# Patient Record
Sex: FEMALE | Race: WHITE | NOT HISPANIC OR LATINO | Employment: OTHER | URBAN - METROPOLITAN AREA
[De-identification: names, ages, dates, MRNs, and addresses within clinical notes are randomized per-mention and may not be internally consistent; named-entity substitution may affect disease eponyms.]

---

## 2020-02-07 ENCOUNTER — HOSPITAL ENCOUNTER (EMERGENCY)
Facility: CLINIC | Age: 75
Discharge: HOME | End: 2020-02-07
Attending: EMERGENCY MEDICINE | Admitting: EMERGENCY MEDICINE
Payer: COMMERCIAL

## 2020-02-07 ENCOUNTER — APPOINTMENT (OUTPATIENT)
Dept: GENERAL RADIOLOGY | Facility: CLINIC | Age: 75
End: 2020-02-07
Attending: EMERGENCY MEDICINE
Payer: COMMERCIAL

## 2020-02-07 VITALS
RESPIRATION RATE: 14 BRPM | TEMPERATURE: 97.4 F | SYSTOLIC BLOOD PRESSURE: 127 MMHG | WEIGHT: 136 LBS | BODY MASS INDEX: 19.47 KG/M2 | HEIGHT: 70 IN | OXYGEN SATURATION: 100 % | DIASTOLIC BLOOD PRESSURE: 66 MMHG

## 2020-02-07 DIAGNOSIS — R53.83 OTHER FATIGUE: ICD-10-CM

## 2020-02-07 DIAGNOSIS — R06.02 SHORTNESS OF BREATH: ICD-10-CM

## 2020-02-07 LAB
ANION GAP SERPL CALCULATED.3IONS-SCNC: 9 MMOL/L (ref 3–14)
BASOPHILS # BLD AUTO: 0.1 10E9/L (ref 0–0.2)
BASOPHILS NFR BLD AUTO: 0.8 %
BUN SERPL-MCNC: 22 MG/DL (ref 7–30)
CALCIUM SERPL-MCNC: 9 MG/DL (ref 8.5–10.1)
CHLORIDE SERPL-SCNC: 103 MMOL/L (ref 94–109)
CO2 SERPL-SCNC: 22 MMOL/L (ref 20–32)
CREAT SERPL-MCNC: 0.86 MG/DL (ref 0.52–1.04)
DIFFERENTIAL METHOD BLD: ABNORMAL
EOSINOPHIL # BLD AUTO: 0.1 10E9/L (ref 0–0.7)
EOSINOPHIL NFR BLD AUTO: 1.5 %
ERYTHROCYTE [DISTWIDTH] IN BLOOD BY AUTOMATED COUNT: 13.2 % (ref 10–15)
GFR SERPL CREATININE-BSD FRML MDRD: 67 ML/MIN/{1.73_M2}
GLUCOSE SERPL-MCNC: 100 MG/DL (ref 70–99)
HCT VFR BLD AUTO: 36.7 % (ref 35–47)
HGB BLD-MCNC: 11.7 G/DL (ref 11.7–15.7)
IMM GRANULOCYTES # BLD: 0 10E9/L (ref 0–0.4)
IMM GRANULOCYTES NFR BLD: 0.2 %
LYMPHOCYTES # BLD AUTO: 2.8 10E9/L (ref 0.8–5.3)
LYMPHOCYTES NFR BLD AUTO: 47 %
MCH RBC QN AUTO: 31.1 PG (ref 26.5–33)
MCHC RBC AUTO-ENTMCNC: 31.9 G/DL (ref 31.5–36.5)
MCV RBC AUTO: 98 FL (ref 78–100)
MONOCYTES # BLD AUTO: 0.5 10E9/L (ref 0–1.3)
MONOCYTES NFR BLD AUTO: 8.8 %
NEUTROPHILS # BLD AUTO: 2.5 10E9/L (ref 1.6–8.3)
NEUTROPHILS NFR BLD AUTO: 41.7 %
NRBC # BLD AUTO: 0 10*3/UL
NRBC BLD AUTO-RTO: 0 /100
PLATELET # BLD AUTO: 223 10E9/L (ref 150–450)
POTASSIUM SERPL-SCNC: 3.5 MMOL/L (ref 3.4–5.3)
PROCALCITONIN SERPL-MCNC: <0.05 NG/ML
RBC # BLD AUTO: 3.76 10E12/L (ref 3.8–5.2)
SODIUM SERPL-SCNC: 134 MMOL/L (ref 133–144)
TROPONIN I SERPL-MCNC: <0.015 UG/L (ref 0–0.04)
WBC # BLD AUTO: 5.9 10E9/L (ref 4–11)

## 2020-02-07 PROCEDURE — 96360 HYDRATION IV INFUSION INIT: CPT

## 2020-02-07 PROCEDURE — 84145 PROCALCITONIN (PCT): CPT | Performed by: EMERGENCY MEDICINE

## 2020-02-07 PROCEDURE — 71046 X-RAY EXAM CHEST 2 VIEWS: CPT

## 2020-02-07 PROCEDURE — 84484 ASSAY OF TROPONIN QUANT: CPT | Performed by: EMERGENCY MEDICINE

## 2020-02-07 PROCEDURE — 80048 BASIC METABOLIC PNL TOTAL CA: CPT | Performed by: EMERGENCY MEDICINE

## 2020-02-07 PROCEDURE — 85025 COMPLETE CBC W/AUTO DIFF WBC: CPT | Performed by: EMERGENCY MEDICINE

## 2020-02-07 PROCEDURE — 99285 EMERGENCY DEPT VISIT HI MDM: CPT | Mod: 25

## 2020-02-07 PROCEDURE — 25800030 ZZH RX IP 258 OP 636: Performed by: EMERGENCY MEDICINE

## 2020-02-07 PROCEDURE — 93005 ELECTROCARDIOGRAM TRACING: CPT

## 2020-02-07 PROCEDURE — 25000132 ZZH RX MED GY IP 250 OP 250 PS 637: Performed by: EMERGENCY MEDICINE

## 2020-02-07 PROCEDURE — 96361 HYDRATE IV INFUSION ADD-ON: CPT

## 2020-02-07 RX ORDER — LORAZEPAM 0.5 MG/1
0.5 TABLET ORAL ONCE
Status: COMPLETED | OUTPATIENT
Start: 2020-02-07 | End: 2020-02-07

## 2020-02-07 RX ADMIN — LORAZEPAM 0.5 MG: 0.5 TABLET ORAL at 18:20

## 2020-02-07 RX ADMIN — SODIUM CHLORIDE 1000 ML: 9 INJECTION, SOLUTION INTRAVENOUS at 17:27

## 2020-02-07 ASSESSMENT — ENCOUNTER SYMPTOMS
BLOOD IN STOOL: 0
SHORTNESS OF BREATH: 1
PALPITATIONS: 1
CHEST TIGHTNESS: 1
NAUSEA: 1
APPETITE CHANGE: 0
LIGHT-HEADEDNESS: 1
ABDOMINAL PAIN: 0
NERVOUS/ANXIOUS: 1
FATIGUE: 1
DIARRHEA: 0
HEADACHES: 0
COUGH: 0
VOMITING: 0

## 2020-02-07 ASSESSMENT — MIFFLIN-ST. JEOR: SCORE: 1207.14

## 2020-02-07 NOTE — ED PROVIDER NOTES
History     Chief Complaint:  Generalized Weakness    HPI   Lyndsey Orosco is a 74 year old female with a history of asthma, hypothyroidism and recent pneumonia who presents to the emergency department for evaluation of generalized weakness. The patient reports that on 01/14/2020 she was seen for an ear infection and bronchitis and she was treated. She was then seen on 1/18/2020 and diagnosed with pneumonia. She spent 2 days in the hospital and was discharged with 2 days of a antibiotics. She then had a follow up visit with her clinic yesterday and there were no concerns at the clinic. Since her discharge the patient states that every few days she gets spells where she gets lightheaded and short of breath. Today she reports that she was sitting at a computer most of the afternoon and doing some light cleaning when she began to feel some chest pressure. She reports some central chest pressure and irritation in her chest. The patient then states that at about 1615 she began to feel lightheaded like she would pass out. She reports feeling palpitations, fatigue and feeling nauseated. She states a concern that she still has pneumonia. The patient does mention that her symptoms are similar to the symptoms that present with her anxiety attacks, but she only has anxiety attacks about once a year. She denies any coughing, vomiting, abdominal pain, diarrhea, leg swelling, rash, headache, changes in appetite, blood stool or visual disturbances.    Allergies:  Penicillins  Ciprofloxacin    Medications:    Albuterol  Excedrin Migraine  Atenolol  Synthroid  Opcon  Xanax    Past Medical History:    Anxiety   Vertigo  Pneumonia  Osteoporosis  Asthma  Monoclonal Gammopathy  Neuroma  Hypothyroidism  Cataract  Headache  Panic Attack    Past Surgical History:    Foot Neuroma surgery  Ankle fracture treatment  Tonsillectomy  Breast cyst aspiration  Breast biopsy    Family History:    Cardiovascular  "  Depression  Migraines  Suicide  TB  Cataract  Heart failure  Macular degeneration  Stroke    Social History:  The patient was accompanied to the ED by her .  Smoking Status: Never  Smokeless Tobacco: Never  Alcohol Use: Yes  Drug Use: Not on file  Marital Status:        Review of Systems   Constitutional: Positive for fatigue. Negative for appetite change.   Eyes: Negative for visual disturbance.   Respiratory: Positive for chest tightness and shortness of breath. Negative for cough.    Cardiovascular: Positive for palpitations. Negative for leg swelling.   Gastrointestinal: Positive for nausea. Negative for abdominal pain, blood in stool, diarrhea and vomiting.   Skin: Negative for rash.   Neurological: Positive for light-headedness. Negative for headaches.   Psychiatric/Behavioral: The patient is nervous/anxious.    All other systems reviewed and are negative.      Physical Exam   Vitals:  Patient Vitals for the past 24 hrs:   BP Temp Temp src Heart Rate Resp SpO2 Height Weight   02/07/20 1700 127/66 -- -- -- 14 100 % -- --   02/07/20 1657 -- 97.4  F (36.3  C) Oral -- -- -- -- --   02/07/20 1645 134/61 -- Temporal 67 20 100 % 1.778 m (5' 10\") 61.7 kg (136 lb)     Physical Exam  General: Patient is awake, alert and interactive when I enter the room  Head: The scalp, face, and head appear normal  Eyes: The pupils are equal, round, and reactive to light. Conjunctivae and sclerae are normal  ENT: External acoustic canals are normal. The oropharynx is normal without erythema. Uvula is in the midline  Neck: Normal range of motion. No anterior cervical lymphadenopathy noted  CV: Regular rate. S1/S2. No murmurs.   Resp: Lungs are clear without wheezes or rales. No respiratory distress.   GI: Abdomen is soft, no rigidity, guarding, or rebound. No distension. No tenderness to palpation in any quadrant.     MS: Normal tone. Joints grossly normal without effusions. No asymmetric leg swelling, calf or thigh " tenderness.    Skin: No rash or lesions noted. Normal capillary refill noted  Neuro: Speech is normal and fluent. Face is symmetric. Moving all extremities.   Psych:  Normal affect.  Appropriate interactions. Anxious appearing.    Emergency Department Course   ECG:  Indication: Generalized Weakness  Completed at 1651.  Read at 1700.   Rate 62 bpm. SD interval 166. QRS duration 86. QT/QTc 436/442. P-R-T axes 39 69 71.  Normal sinus rhythm  Normal ECG    Imaging:  Radiographic findings were communicated with the patient who voiced understanding of the findings.    XR Chest 2 Views  Linear atelectasis and infiltrate emanating from the right hilum into the right upper lung zone. New from the previous study. Prominence of the interstitial markings. Heart size normal. ASCVD aorta.  Reading per radiology.    Laboratory:  CBC: WNL. (WBC 5.9, HGB 11.7, )   BMP: Glucose 100 (H) o/w AWNL (Creatinine 0.86)    Troponin (Collected 1709): <0.015     Procalcitonin: Pending    Interventions:  1727 NS, 1 L, IV bolus  1820 Ativan 0.5 mg PO    Emergency Department Course:  Past medical records, nursing notes, and vitals reviewed.    1712 I performed an exam of the patient as documented above.     EKG obtained in the ED, see results above.   IV was inserted and blood was drawn for laboratory testing, results above.  The patient was sent for a chest XR while in the emergency department, results above.     1954 I rechecked the patient and discussed the results of her workup thus far. The patient reports feeling better after her Ativan, and states a desire to go home.    Findings and plan explained to the Patient. Patient discharged home with instructions regarding supportive care, medications, and reasons to return. The importance of close follow-up was reviewed.     I personally reviewed the laboratory results with the Patient and answered all related questions prior to discharge.    Impression & Plan      Medical Decision  Making:  Patient is a 74-year-old female with past medical history of asthma, hypothyroidism and recent diagnosis of pneumonia in January who presents emergency department with ongoing generalized weakness.  Upon initial evaluation here she is hemodynamically stable with normal vital signs.  She is afebrile.  On physical exam she is overall well-appearing and does not appear to be in any acute respiratory distress.  EKG was obtained which does not show any acute signs of ischemia nor dysrhythmia.  Troponin was negative x1.  Chest x-ray was obtained which shows show an infiltrate in the right middle lobe which when compared with her previous x-rays and CT scans likely is unchanged from her previous exams.  Her procalcitonin is negative making chronic infection and significantly less likely.  The remainder of her work-up was reassuring.  There certainly appears to be a large component of anxiety to her symptoms here today.  However she is recovering from pneumonia and this will also take some time which I reiterated with her today.  At this point I do feel it is safe for her to go home and I will not prescribe any outpatient antibiotics.  I discussed reasons to return to the emergency department.  All questions were answered and patient be discharged home in stable condition.    Diagnosis:    ICD-10-CM    1. Shortness of breath R06.02    2. Other fatigue R53.83        Disposition:  discharged to home    Discharge Medications:  None      I, Montez Reddy, am serving as a scribe on 2/7/2020 at 4:54 PM to personally document services performed by Jatinder Lopez* based on my observations and the provider's statements to me.     Montez Reddy  2/7/2020   [facility] EMERGENCY DEPARTMENT       Jatinder Lopez MD  02/07/20 3261

## 2020-02-07 NOTE — ED AVS SNAPSHOT
[facility] Emergency Department  [address]  [facility] [address]  Phone:  [phone]  Fax:  [phone]                                    Lyndsey Orosco   MRN: [id number]    Department:  [facility] Emergency Department   Date of Visit:  2/7/2020           After Visit Summary Signature Page    I have received my discharge instructions, and my questions have been answered. I have discussed any challenges I see with this plan with the nurse or doctor.    ..........................................................................................................................................  Patient/Patient Representative Signature      ..........................................................................................................................................  Patient Representative Print Name and Relationship to Patient    ..................................................               ................................................  Date                                   Time    ..........................................................................................................................................  Reviewed by Signature/Title    ...................................................              ..............................................  Date                                               Time          22EPIC Rev 08/18

## 2020-02-07 NOTE — ED TRIAGE NOTES
Pt presents for evaluation of generalized weakness. Had a recent bout of pneumonia requiring admission and IV antibiotics a few weeks ago. Pt has been feeling unwell since discharge. Has had intermittent weakness and feelings of passing out. Today, after doing things around the house, pt became weak, short of breath and lightheaded with midsternal chest pressure. Had follow up with MD yesterday.

## 2020-02-09 LAB — INTERPRETATION ECG - MUSE: NORMAL

## 2022-10-05 ENCOUNTER — APPOINTMENT (OUTPATIENT)
Dept: CT IMAGING | Facility: CLINIC | Age: 77
End: 2022-10-05
Attending: EMERGENCY MEDICINE
Payer: COMMERCIAL

## 2022-10-05 ENCOUNTER — APPOINTMENT (OUTPATIENT)
Dept: GENERAL RADIOLOGY | Facility: CLINIC | Age: 77
End: 2022-10-05
Attending: EMERGENCY MEDICINE
Payer: COMMERCIAL

## 2022-10-05 ENCOUNTER — HOSPITAL ENCOUNTER (EMERGENCY)
Facility: CLINIC | Age: 77
Discharge: HOME | End: 2022-10-05
Attending: EMERGENCY MEDICINE | Admitting: EMERGENCY MEDICINE
Payer: COMMERCIAL

## 2022-10-05 VITALS
WEIGHT: 130 LBS | BODY MASS INDEX: 18.61 KG/M2 | HEART RATE: 69 BPM | RESPIRATION RATE: 16 BRPM | DIASTOLIC BLOOD PRESSURE: 58 MMHG | TEMPERATURE: 98 F | SYSTOLIC BLOOD PRESSURE: 108 MMHG | OXYGEN SATURATION: 100 % | HEIGHT: 70 IN

## 2022-10-05 DIAGNOSIS — R07.9 CHEST PAIN, UNSPECIFIED TYPE: ICD-10-CM

## 2022-10-05 LAB
ALBUMIN SERPL BCG-MCNC: 4.2 G/DL (ref 3.5–5.2)
ALP SERPL-CCNC: 78 U/L (ref 35–104)
ALT SERPL W P-5'-P-CCNC: 24 U/L (ref 10–35)
ANION GAP SERPL CALCULATED.3IONS-SCNC: 11 MMOL/L (ref 7–15)
AST SERPL W P-5'-P-CCNC: 47 U/L (ref 10–35)
ATRIAL RATE - MUSE: 76 BPM
BASOPHILS # BLD AUTO: 0 10E3/UL (ref 0–0.2)
BASOPHILS NFR BLD AUTO: 0 %
BILIRUB SERPL-MCNC: 0.7 MG/DL
BUN SERPL-MCNC: 15.5 MG/DL (ref 8–23)
CALCIUM SERPL-MCNC: 9.2 MG/DL (ref 8.8–10.2)
CHLORIDE SERPL-SCNC: 103 MMOL/L (ref 98–107)
CREAT SERPL-MCNC: 0.85 MG/DL (ref 0.51–0.95)
D DIMER PPP FEU-MCNC: 11.56 UG/ML FEU (ref 0–0.5)
DEPRECATED HCO3 PLAS-SCNC: 22 MMOL/L (ref 22–29)
DIASTOLIC BLOOD PRESSURE - MUSE: NORMAL MMHG
EOSINOPHIL # BLD AUTO: 0 10E3/UL (ref 0–0.7)
EOSINOPHIL NFR BLD AUTO: 0 %
ERYTHROCYTE [DISTWIDTH] IN BLOOD BY AUTOMATED COUNT: 22.3 % (ref 10–15)
GFR SERPL CREATININE-BSD FRML MDRD: 71 ML/MIN/1.73M2
GLUCOSE SERPL-MCNC: 89 MG/DL (ref 70–99)
HCT VFR BLD AUTO: 32.3 % (ref 35–47)
HGB BLD-MCNC: 10.3 G/DL (ref 11.7–15.7)
IMM GRANULOCYTES # BLD: 0 10E3/UL
IMM GRANULOCYTES NFR BLD: 1 %
INTERPRETATION ECG - MUSE: NORMAL
LYMPHOCYTES # BLD AUTO: 0.6 10E3/UL (ref 0.8–5.3)
LYMPHOCYTES NFR BLD AUTO: 13 %
MCH RBC QN AUTO: 30.5 PG (ref 26.5–33)
MCHC RBC AUTO-ENTMCNC: 31.9 G/DL (ref 31.5–36.5)
MCV RBC AUTO: 96 FL (ref 78–100)
MONOCYTES # BLD AUTO: 0.3 10E3/UL (ref 0–1.3)
MONOCYTES NFR BLD AUTO: 6 %
NEUTROPHILS # BLD AUTO: 3.5 10E3/UL (ref 1.6–8.3)
NEUTROPHILS NFR BLD AUTO: 80 %
NRBC # BLD AUTO: 0 10E3/UL
NRBC BLD AUTO-RTO: 0 /100
P AXIS - MUSE: 26 DEGREES
PLATELET # BLD AUTO: 59 10E3/UL (ref 150–450)
POTASSIUM SERPL-SCNC: 3.2 MMOL/L (ref 3.4–5.3)
PR INTERVAL - MUSE: 166 MS
PROT SERPL-MCNC: 7.3 G/DL (ref 6.4–8.3)
QRS DURATION - MUSE: 80 MS
QT - MUSE: 364 MS
QTC - MUSE: 409 MS
R AXIS - MUSE: 60 DEGREES
RBC # BLD AUTO: 3.38 10E6/UL (ref 3.8–5.2)
SODIUM SERPL-SCNC: 136 MMOL/L (ref 136–145)
SYSTOLIC BLOOD PRESSURE - MUSE: NORMAL MMHG
T AXIS - MUSE: 75 DEGREES
TROPONIN T SERPL HS-MCNC: 10 NG/L
TROPONIN T SERPL HS-MCNC: 11 NG/L
VENTRICULAR RATE- MUSE: 76 BPM
WBC # BLD AUTO: 4.4 10E3/UL (ref 4–11)

## 2022-10-05 PROCEDURE — 99285 EMERGENCY DEPT VISIT HI MDM: CPT | Mod: 25

## 2022-10-05 PROCEDURE — 258N000003 HC RX IP 258 OP 636: Performed by: EMERGENCY MEDICINE

## 2022-10-05 PROCEDURE — 250N000011 HC RX IP 250 OP 636: Performed by: EMERGENCY MEDICINE

## 2022-10-05 PROCEDURE — 71046 X-RAY EXAM CHEST 2 VIEWS: CPT

## 2022-10-05 PROCEDURE — 80053 COMPREHEN METABOLIC PANEL: CPT | Performed by: EMERGENCY MEDICINE

## 2022-10-05 PROCEDURE — 85379 FIBRIN DEGRADATION QUANT: CPT | Performed by: EMERGENCY MEDICINE

## 2022-10-05 PROCEDURE — 93005 ELECTROCARDIOGRAM TRACING: CPT

## 2022-10-05 PROCEDURE — 71275 CT ANGIOGRAPHY CHEST: CPT

## 2022-10-05 PROCEDURE — 82040 ASSAY OF SERUM ALBUMIN: CPT | Performed by: EMERGENCY MEDICINE

## 2022-10-05 PROCEDURE — 36415 COLL VENOUS BLD VENIPUNCTURE: CPT | Performed by: EMERGENCY MEDICINE

## 2022-10-05 PROCEDURE — 85025 COMPLETE CBC W/AUTO DIFF WBC: CPT | Performed by: EMERGENCY MEDICINE

## 2022-10-05 PROCEDURE — 84484 ASSAY OF TROPONIN QUANT: CPT | Performed by: EMERGENCY MEDICINE

## 2022-10-05 PROCEDURE — 96360 HYDRATION IV INFUSION INIT: CPT | Mod: 59

## 2022-10-05 RX ORDER — IOPAMIDOL 755 MG/ML
500 INJECTION, SOLUTION INTRAVASCULAR ONCE
Status: COMPLETED | OUTPATIENT
Start: 2022-10-05 | End: 2022-10-05

## 2022-10-05 RX ADMIN — IOPAMIDOL 60 ML: 755 INJECTION, SOLUTION INTRAVENOUS at 14:11

## 2022-10-05 RX ADMIN — SODIUM CHLORIDE 80 ML: 9 INJECTION, SOLUTION INTRAVENOUS at 14:11

## 2022-10-05 ASSESSMENT — ACTIVITIES OF DAILY LIVING (ADL)
ADLS_ACUITY_SCORE: 35

## 2022-10-05 ASSESSMENT — ENCOUNTER SYMPTOMS
BACK PAIN: 1
ABDOMINAL PAIN: 0
SHORTNESS OF BREATH: 1

## 2022-10-05 NOTE — DISCHARGE INSTRUCTIONS
As we discussed, no clear cause of your chest pain was found today.  This does not seem to have been related to your heart, and the CT scan shows no evidence of blood clot or pneumonia.  Please follow with your regular doctor soon as possible, preferably within 48 hours to make sure things continue to get better, and come back to the ER with any other concerns, specifically develop any new chest pain, if your pain becomes exertional or if you develop any other new symptoms.  We see no evidence of a fever or infection here, your hemoglobin is reassuring, and I do believe that you are stable to follow with your regular doctor as we discussed.

## 2022-10-05 NOTE — ED PROVIDER NOTES
History   Chief Complaint:  Chest Pain     The history is provided by the patient.      Lyndsey Orosco is a 77 year old female with history of breast cancer, asthma, and non-rheumatic aortic valve insufficiency, who presents with new chest pain, shortness of breath, and low back ache lasting for an approximately 15 minute period today. Yesterday patient also felt shortness of breath while doing yard work and wonders if this could be related.      She does have a history of cancer, stopped chemo 7 weeks ago because of low hemoglobin - reports recent 3-4 weeks of blood transfusions. The patient had similar shortness of breath at that time. She was in [facility] a couple of weeks ago and received Keytruda. The patient is on steroids, not anticoagulated. She has no history of GERD. The patient denies abdominal pain.    Review of Systems   Respiratory: Positive for shortness of breath.    Cardiovascular: Positive for chest pain.   Gastrointestinal: Negative for abdominal pain.   Musculoskeletal: Positive for back pain.   All other systems reviewed and are negative.    Allergies:  Penicillins  Ciprofloxacin  Sulfa Drugs    Medications:  Albuterol  Excedrin migraine  Atenolol  Alprazolam  Prednisone  Calcium carbonate  Calcium-vitamin D  Ondansetron  Prochlorperazine  Rizatriptan  Lorazepam  Omeprazole  Famotidine  Folic acid  Levothyroxine sodium  Opcon-A    Past Medical History:     Pancytopenia  Hypotension  Sepsis  Hyponatremia  Anemia, macrocytic  Acute kidney injury  Neuroma  Thrombocytopenia  Malignant neoplasm of right breast  Atrial tachycardia  Non-rheumatic aortic valve insufficiency  Panic attacks  Myofascial pain syndrome, cervical  Systolic murmur  Primary ostearthritis  Migraine  Monoclonal paraproteinemia  Conversion disorder  Cataracts  Osteoporosis  Asthma  Diffuse cystic mastopathy    Past Surgical History:    Foot neuroma, right x2  Ankle fracture treatment, left  Tonsillectomy  Ankle surgery  Puncture  "aspiration cyst breast  Breast biopsy, left    Family History:    Cardiovascular  Depression  Migraines  Suicide  TB  Cataract  Heart failure  Stroke  Diabetes mellitus  Migraines    Social History:  The patient presents to the ED alone.  The patient arrived in a private vehicle.  PCP: [facility], [place]     Physical Exam     Patient Vitals for the past 24 hrs:   BP Temp Temp src Pulse Resp SpO2 Height Weight   10/05/22 1533 -- -- -- -- -- 100 % -- --   10/05/22 1532 108/58 -- -- 69 -- -- -- --   10/05/22 1008 117/73 98  F (36.7  C) Temporal 82 16 97 % -- --   10/05/22 1003 -- -- -- -- -- -- 1.778 m (5' 10\") 59 kg (130 lb)     Physical Exam  Vitals: reviewed by me  General: Pt seen on [facility], pleasant, cooperative, and alert to conversation  Eyes: Tracking well, clear conjunctiva BL  ENT: MMM, midline trachea.   Lungs: No tachypnea, no accessory muscle use. No respiratory distress.  Lungs are clear to auscultation bilaterally  CV: Rate as above, normal S1 and S2, no other sounds heard   Abd: Soft, non tender, no guarding, no rebound. Non distended  MSK: no joint effusion.  No evidence of trauma  Skin: No rash  Neuro: Clear speech and no facial droop.  Psych: Not RIS, no e/o AH/VH    Emergency Department Course   ECG  ECG results from 10/05/22   EKG 12-lead, tracing only     Value    Systolic Blood Pressure     Diastolic Blood Pressure     Ventricular Rate 76    Atrial Rate 76    FL Interval 166    QRS Duration 80        QTc 409    P Axis 26    R AXIS 60    T Axis 75    Interpretation ECG      Sinus rhythm  Normal ECG  No previous ECGs available       Imaging:  CT Chest Pulmonary Embolism w Contrast   Final Result   IMPRESSION:    No evidence of pulmonary embolism.      ALEE WEI MD            SYSTEM ID:  [id number]      XR Chest 2 Views   Final Result   IMPRESSION: Hyperexpanded lungs. Stable biapical pleural thickening.   Previously seen linear opacities in the right midlung have " resolved.   No infiltrate, pleural effusion or pneumothorax. The cardiac and   mediastinal silhouettes are within normal limits. Left IJ port   catheter tip at the SVC/right atrial junction.      MARCELA ARRIAZA MD            SYSTEM ID:  [id number]        Report per radiology    Laboratory:  Labs Ordered and Resulted from Time of ED Arrival to Time of ED Departure   COMPREHENSIVE METABOLIC PANEL - Abnormal       Result Value    Sodium 136      Potassium 3.2 (*)     Chloride 103      Carbon Dioxide (CO2) 22      Anion Gap 11      Urea Nitrogen 15.5      Creatinine 0.85      Calcium 9.2      Glucose 89      Alkaline Phosphatase 78      AST 47 (*)     ALT 24      Protein Total 7.3      Albumin 4.2      Bilirubin Total 0.7      GFR Estimate 71     D DIMER QUANTITATIVE - Abnormal    D-Dimer Quantitative 11.56 (*)    CBC WITH PLATELETS AND DIFFERENTIAL - Abnormal    WBC Count 4.4      RBC Count 3.38 (*)     Hemoglobin 10.3 (*)     Hematocrit 32.3 (*)     MCV 96      MCH 30.5      MCHC 31.9      RDW 22.3 (*)     Platelet Count 59 (*)     % Neutrophils 80      % Lymphocytes 13      % Monocytes 6      % Eosinophils 0      % Basophils 0      % Immature Granulocytes 1      NRBCs per 100 WBC 0      Absolute Neutrophils 3.5      Absolute Lymphocytes 0.6 (*)     Absolute Monocytes 0.3      Absolute Eosinophils 0.0      Absolute Basophils 0.0      Absolute Immature Granulocytes 0.0      Absolute NRBCs 0.0     TROPONIN T, HIGH SENSITIVITY - Normal    Troponin T, High Sensitivity 11     TROPONIN T, HIGH SENSITIVITY - Normal    Troponin T, High Sensitivity 10       Emergency Department Course:      Reviewed:  I reviewed nursing notes, vitals, past medical history and Care Everywhere.    Assessments:  1032 I obtained history and examined the patient as noted above.   1435 I rechecked the patient and explained findings.  1525 I rechecked the patient and explained findings. I believe that they are safe for discharge at this  time.    Disposition:  The patient was discharged to home.     Impression & Plan     Medical Decision Making:  This is a very pleasant 77-year-old female who presents emergency room with chest pain.  She believes it was an esophageal spasm since an EGD last year showed inflammation.  She does have some ongoing shortness of breath in the background for the last several weeks, though nothing changed today, typically associated with her anemia, although her hemoglobin is quite reassuring here, and she has stopped taking the chemotherapeutic agent that was believed to have been causing her anemia.  I do not think she requires a transfusion here today, my reassessment she tells me she actually feels well enough to go home, and again confirms no further pain here in the ER.  Her troponins are negative x2, her CT scan does not show any evidence of a blood clot or pneumonia, and she otherwise is doing very well here with normal vital signs and a normal physical exam.  She is not in any obvious distress, and tells me she feels comfortable going home and is going to call her  to pick her up.  She knows that no clear cause of her symptoms was found, she tells me she lives 5 minutes away and will immediately come back if her pain returns or if any other concerns develop.  She is very reliable appearing and I do believe her wishes that she will come back with anything that changes, she also has an appointment in less than 48 hours for routine lab monitoring, and I think that this is an appropriate neck step as well.  We will plan for discharge as above    Diagnosis:    ICD-10-CM    1. Chest pain, unspecified type  R07.9      Scribe Disclosure:  I, Zarina Ortez, am serving as a scribe at 2:33 PM on 10/5/2022 to document services personally performed by Suman Jackson MD based on my observations and the provider's statements to me.     Suman Jackson MD  10/05/22 1540

## 2022-10-05 NOTE — ED NOTES
Rapid Assessment Note    History:   Lyndsey Orosco is a 77 year old female who presents with new chest pain, shortness of breath, and low back ache lasting for an approximately 15 minute period today. Yesterday patient also felt shortness of breath while doing yard work and wonders if this could be related.     Has history of cancer, stopped chemo 7 weeks ago because of low hemoglobin- reports recent 3-4 weeks of blood transfusions.. Patient had similar shortness of breath at that time. Was in [place] a couple of weeks ago and received Keytruda.  Patient on steroids, not anticoagulated. No history of GERD. No abdominal pain.    Exam:   General:  Alert, interactive  Cardiovascular:  Well perfused  Lungs:  No respiratory distress, no accessory muscle use  Neuro:  Moving all 4 extremities  Skin:  Warm, dry  Psych:  Normal affect      Plan of Care:   I evaluated the patient and developed an initial plan of care. I discussed this plan and explained that I, or one of my partners, would be returning to complete the evaluation.     I, Zarina Ortez, am serving as a scribe to document services personally performed by Suman Jackson MD, based on my observations and the provider's statements to me.    10/5/2022  [employer]    Portions of this medical record were completed by a scribe. UPON MY REVIEW AND AUTHENTICATION BY ELECTRONIC SIGNATURE, this confirms (a) I performed the applicable clinical services, and (b) the record is accurate.      Suman Jackson MD  10/05/22 1041

## 2022-10-05 NOTE — ED TRIAGE NOTES
"Pt is currently being treated for breast cancer. Today had an episode of chest pain, c/o \"burning pain\" that radiates into low back. Pain has resolved but pt c/o dypsnea.      Triage Assessment     Row Name 10/05/22 1004       Triage Assessment (Adult)    Airway WDL WDL       Respiratory WDL    Respiratory WDL --  incresed dyspnea       Skin Circulation/Temperature WDL    Skin Circulation/Temperature WDL WDL       Cardiac WDL    Cardiac WDL chest pain       Chest Pain Assessment    Chest Pain Location other (see comments)  throat to bottom of sternum pain, \"burning\"    Chest Pain Radiation back              "

## 2022-11-06 ENCOUNTER — APPOINTMENT (OUTPATIENT)
Dept: CT IMAGING | Facility: CLINIC | Age: 77
End: 2022-11-06
Attending: EMERGENCY MEDICINE
Payer: COMMERCIAL

## 2022-11-06 ENCOUNTER — HOSPITAL ENCOUNTER (EMERGENCY)
Facility: CLINIC | Age: 77
Discharge: HOME | End: 2022-11-06
Attending: EMERGENCY MEDICINE | Admitting: EMERGENCY MEDICINE
Payer: COMMERCIAL

## 2022-11-06 VITALS
TEMPERATURE: 97.8 F | DIASTOLIC BLOOD PRESSURE: 75 MMHG | SYSTOLIC BLOOD PRESSURE: 133 MMHG | RESPIRATION RATE: 24 BRPM | OXYGEN SATURATION: 100 % | HEART RATE: 84 BPM

## 2022-11-06 DIAGNOSIS — R06.00 DYSPNEA, UNSPECIFIED TYPE: ICD-10-CM

## 2022-11-06 DIAGNOSIS — C50.911 MALIGNANT NEOPLASM OF RIGHT FEMALE BREAST, UNSPECIFIED ESTROGEN RECEPTOR STATUS, UNSPECIFIED SITE OF BREAST (H): ICD-10-CM

## 2022-11-06 LAB
ALBUMIN SERPL BCG-MCNC: 3.9 G/DL (ref 3.5–5.2)
ALP SERPL-CCNC: 41 U/L (ref 35–104)
ALT SERPL W P-5'-P-CCNC: 15 U/L (ref 10–35)
ANION GAP SERPL CALCULATED.3IONS-SCNC: 13 MMOL/L (ref 7–15)
AST SERPL W P-5'-P-CCNC: 15 U/L (ref 10–35)
BASOPHILS # BLD AUTO: 0 10E3/UL (ref 0–0.2)
BASOPHILS NFR BLD AUTO: 0 %
BILIRUB SERPL-MCNC: 0.7 MG/DL
BUN SERPL-MCNC: 18.4 MG/DL (ref 8–23)
CALCIUM SERPL-MCNC: 8.7 MG/DL (ref 8.8–10.2)
CHLORIDE SERPL-SCNC: 101 MMOL/L (ref 98–107)
CREAT BLD-MCNC: 0.9 MG/DL (ref 0.5–1)
CREAT SERPL-MCNC: 0.86 MG/DL (ref 0.51–0.95)
DEPRECATED HCO3 PLAS-SCNC: 18 MMOL/L (ref 22–29)
EOSINOPHIL # BLD AUTO: 0 10E3/UL (ref 0–0.7)
EOSINOPHIL NFR BLD AUTO: 0 %
ERYTHROCYTE [DISTWIDTH] IN BLOOD BY AUTOMATED COUNT: 17.4 % (ref 10–15)
GFR SERPL CREATININE-BSD FRML MDRD: 70 ML/MIN/1.73M2
GFR SERPL CREATININE-BSD FRML MDRD: >60 ML/MIN/1.73M2
GLUCOSE SERPL-MCNC: 108 MG/DL (ref 70–99)
HCT VFR BLD AUTO: 33.5 % (ref 35–47)
HGB BLD-MCNC: 10.8 G/DL (ref 11.7–15.7)
IMM GRANULOCYTES # BLD: 0 10E3/UL
IMM GRANULOCYTES NFR BLD: 1 %
LYMPHOCYTES # BLD AUTO: 0.5 10E3/UL (ref 0.8–5.3)
LYMPHOCYTES NFR BLD AUTO: 10 %
MCH RBC QN AUTO: 33.1 PG (ref 26.5–33)
MCHC RBC AUTO-ENTMCNC: 32.2 G/DL (ref 31.5–36.5)
MCV RBC AUTO: 103 FL (ref 78–100)
MONOCYTES # BLD AUTO: 0.2 10E3/UL (ref 0–1.3)
MONOCYTES NFR BLD AUTO: 5 %
NEUTROPHILS # BLD AUTO: 4 10E3/UL (ref 1.6–8.3)
NEUTROPHILS NFR BLD AUTO: 84 %
NRBC # BLD AUTO: 0 10E3/UL
NRBC BLD AUTO-RTO: 0 /100
NT-PROBNP SERPL-MCNC: 177 PG/ML (ref 0–900)
PLATELET # BLD AUTO: 171 10E3/UL (ref 150–450)
POTASSIUM SERPL-SCNC: 4 MMOL/L (ref 3.4–5.3)
PROT SERPL-MCNC: 6.5 G/DL (ref 6.4–8.3)
RBC # BLD AUTO: 3.26 10E6/UL (ref 3.8–5.2)
SODIUM SERPL-SCNC: 132 MMOL/L (ref 136–145)
TROPONIN T SERPL HS-MCNC: 10 NG/L
WBC # BLD AUTO: 4.7 10E3/UL (ref 4–11)

## 2022-11-06 PROCEDURE — 80053 COMPREHEN METABOLIC PANEL: CPT | Performed by: EMERGENCY MEDICINE

## 2022-11-06 PROCEDURE — 85025 COMPLETE CBC W/AUTO DIFF WBC: CPT | Performed by: EMERGENCY MEDICINE

## 2022-11-06 PROCEDURE — 250N000011 HC RX IP 250 OP 636: Performed by: EMERGENCY MEDICINE

## 2022-11-06 PROCEDURE — 250N000009 HC RX 250: Performed by: EMERGENCY MEDICINE

## 2022-11-06 PROCEDURE — 84484 ASSAY OF TROPONIN QUANT: CPT | Performed by: EMERGENCY MEDICINE

## 2022-11-06 PROCEDURE — 71275 CT ANGIOGRAPHY CHEST: CPT

## 2022-11-06 PROCEDURE — 36415 COLL VENOUS BLD VENIPUNCTURE: CPT | Performed by: EMERGENCY MEDICINE

## 2022-11-06 PROCEDURE — 83880 ASSAY OF NATRIURETIC PEPTIDE: CPT | Performed by: EMERGENCY MEDICINE

## 2022-11-06 PROCEDURE — 93005 ELECTROCARDIOGRAM TRACING: CPT

## 2022-11-06 PROCEDURE — 82565 ASSAY OF CREATININE: CPT | Mod: 91

## 2022-11-06 PROCEDURE — 250N000013 HC RX MED GY IP 250 OP 250 PS 637: Performed by: EMERGENCY MEDICINE

## 2022-11-06 PROCEDURE — 94640 AIRWAY INHALATION TREATMENT: CPT

## 2022-11-06 PROCEDURE — 99285 EMERGENCY DEPT VISIT HI MDM: CPT | Mod: 25

## 2022-11-06 RX ORDER — IOPAMIDOL 755 MG/ML
500 INJECTION, SOLUTION INTRAVASCULAR ONCE
Status: COMPLETED | OUTPATIENT
Start: 2022-11-06 | End: 2022-11-06

## 2022-11-06 RX ORDER — POTASSIUM CHLORIDE 750 MG/1
20 TABLET, EXTENDED RELEASE ORAL 2 TIMES DAILY
COMMUNITY
Start: 2022-10-11 | End: 2023-10-11

## 2022-11-06 RX ORDER — ALPRAZOLAM 0.25 MG
0.12 TABLET ORAL 2 TIMES DAILY PRN
COMMUNITY

## 2022-11-06 RX ORDER — SULFAMETHOXAZOLE/TRIMETHOPRIM 800-160 MG
1 TABLET ORAL
COMMUNITY
Start: 2022-10-19

## 2022-11-06 RX ORDER — FOLIC ACID 1 MG/1
1 TABLET ORAL DAILY
COMMUNITY
Start: 2022-10-18 | End: 2023-10-18

## 2022-11-06 RX ORDER — LEVOTHYROXINE SODIUM 100 UG/1
100 TABLET ORAL DAILY
COMMUNITY

## 2022-11-06 RX ORDER — ALBUTEROL SULFATE 90 UG/1
2 AEROSOL, METERED RESPIRATORY (INHALATION) ONCE
Status: COMPLETED | OUTPATIENT
Start: 2022-11-06 | End: 2022-11-06

## 2022-11-06 RX ORDER — PREDNISONE 10 MG/1
TABLET ORAL
COMMUNITY
Start: 2022-10-19

## 2022-11-06 RX ADMIN — SODIUM CHLORIDE 75 ML: 9 INJECTION, SOLUTION INTRAVENOUS at 17:59

## 2022-11-06 RX ADMIN — ALBUTEROL SULFATE 2 PUFF: 90 AEROSOL, METERED RESPIRATORY (INHALATION) at 18:23

## 2022-11-06 RX ADMIN — IOPAMIDOL 50 ML: 755 INJECTION, SOLUTION INTRAVENOUS at 17:59

## 2022-11-06 ASSESSMENT — ACTIVITIES OF DAILY LIVING (ADL)
ADLS_ACUITY_SCORE: 35
ADLS_ACUITY_SCORE: 35

## 2022-11-06 ASSESSMENT — ENCOUNTER SYMPTOMS
FEVER: 0
SHORTNESS OF BREATH: 1
WHEEZING: 0
COUGH: 1

## 2022-11-06 NOTE — ED PROVIDER NOTES
History   Chief Complaint:  Shortness of Breath       The history is provided by the patient.      Lyndsey Orosco is a 77 year old female with history of breast cancer who presents with shortness of breath. The patient reports experiencing intermittent shortness of breath for a few weeks. She states that she is able to take deep breaths but that when she exerts herself she becomes short of breath. She is experiencing associated esophageal discomfort that she describes as a rawness, and a cough. She experiences occasional heartburn. She denies fever, wheezing, or leg swelling. She notes that she is currently tapering down on her dosage of prednisone which she began at 60 mg and is now down to 20 mg. She finished chemotherapy in August of this year.    Review of Systems   Constitutional: Negative for fever.   Respiratory: Positive for cough and shortness of breath. Negative for wheezing.    Cardiovascular: Negative for leg swelling.   All other systems reviewed and are negative.    Allergies:  Penicillins  Ciprofloxacin  Sulfa Drugs    Medications:  Synthroid  Pepcid  Klor-con  Bactrim DS  Prednisone  Xanax    Past Medical History:     Pancytopenia  Dyspnea on exertion  Hypotension due to hypovolemia  Sepsis due to undetermined organism  Noninfectious diarrhea  Hyponatremia  Anemia, macrocytic  Thrombocytopenia  Malignant neoplasm of overlapping sites of right breast, estrogen receptor positive  Atrial tachycardia  Non-rheumatic aortic valve insufficiency  Acute hypokalemia  Panic attacks  Myofascial pain syndrome, cervical  Primary osteoarthritis of left knee  Systolic murmur  Migraine without status migrainosus, not intractable  Palpitations  Monoclonal paraproteinemia  Fear of flying  Degeneration of cervical intervertebral disc  Osteoporosis  Diffuse cystic mastopathy  Hypothyroidism  Asthma    Past Surgical History:    Foot neuroma x2  Ankle fracture repair, left  Tonsillectomy  Puncture aspiration cyst breast,  left  Breast biopsy, left  Breast lumpectomy, right     Family History:    Father: Depression, migraines, suicide, tuberculosis  Mother: Cataracts, heart failure, macular degeneration, stroke  Brother: Cataract, diabetes, cataracts  Sister: Cataracts x3, migraines x2    Social History:  The patient presents to the ED with her . They arrived via private vehicle.    Physical Exam     Patient Vitals for the past 24 hrs:   BP Temp Pulse Resp SpO2   11/06/22 2002 -- -- -- -- 100 %   11/06/22 1952 -- -- -- -- 99 %   11/06/22 1942 -- -- -- -- 100 %   11/06/22 1932 -- -- -- -- 100 %   11/06/22 1922 -- -- -- -- 100 %   11/06/22 1912 -- -- -- -- 100 %   11/06/22 1902 -- -- -- -- 100 %   11/06/22 1852 -- -- -- -- 98 %   11/06/22 1851 -- -- -- -- 98 %   11/06/22 1850 -- -- -- -- 100 %   11/06/22 1849 -- -- -- -- 100 %   11/06/22 1848 -- -- -- -- 100 %   11/06/22 1847 -- -- -- -- 100 %   11/06/22 1846 -- -- -- -- 100 %   11/06/22 1845 -- -- -- -- 100 %   11/06/22 1844 -- -- -- -- 99 %   11/06/22 1843 -- -- -- -- 100 %   11/06/22 1842 -- -- -- -- 100 %   11/06/22 1841 -- -- -- -- 100 %   11/06/22 1840 -- -- -- -- 100 %   11/06/22 1839 -- -- -- -- 100 %   11/06/22 1838 -- -- -- -- 100 %   11/06/22 1837 -- -- -- -- 100 %   11/06/22 1836 -- -- -- -- 100 %   11/06/22 1835 -- -- -- -- 100 %   11/06/22 1834 -- -- -- -- 98 %   11/06/22 1833 -- -- -- -- 100 %   11/06/22 1530 133/75 97.8  F (36.6  C) 84 24 100 %       Physical Exam  Nursing note and vitals reviewed.  HENT:   Mouth/Throat: Moist mucous membranes.   Eyes: EOMI, nonicteric sclera  Cardiovascular: Normal rate, regular rhythm, no murmurs, rubs, or gallops  Pulmonary/Chest: Effort normal and breath sounds normal. Mildly tachypneic. No respiratory distress. No wheezes. No rales.   Abdominal: Soft. Nontender, nondistended, no guarding or rigidity.   Musculoskeletal: Normal range of motion.   Neurological: Alert. Moves all extremities spontaneously.   Skin: Skin is warm and  dry. No rash noted.   Psychiatric: Normal mood and affect.       Emergency Department Course     Imaging:  CT Chest Pulmonary Embolism w Contrast   Final Result   IMPRESSION:   1.  Negative for acute pulmonary embolism.      2.  No consolidative airspace disease.      3.  By CT, newly visible large right breast and axillary masses, in a patient with a history of breast cancer.      Discussed with Dr. Henderson by Dr. Kilgore via telephone at [phone] on 11/6/22.      NOTE: ABNORMAL REPORT      THE DICTATION ABOVE DESCRIBES AN ABNORMALITY FOR WHICH FOLLOW-UP IS NEEDED.            Report per radiology    Laboratory:  Labs Ordered and Resulted from Time of ED Arrival to Time of ED Departure   COMPREHENSIVE METABOLIC PANEL - Abnormal       Result Value    Sodium 132 (*)     Potassium 4.0      Chloride 101      Carbon Dioxide (CO2) 18 (*)     Anion Gap 13      Urea Nitrogen 18.4      Creatinine 0.86      Calcium 8.7 (*)     Glucose 108 (*)     Alkaline Phosphatase 41      AST 15      ALT 15      Protein Total 6.5      Albumin 3.9      Bilirubin Total 0.7      GFR Estimate 70     CBC WITH PLATELETS AND DIFFERENTIAL - Abnormal    WBC Count 4.7      RBC Count 3.26 (*)     Hemoglobin 10.8 (*)     Hematocrit 33.5 (*)      (*)     MCH 33.1 (*)     MCHC 32.2      RDW 17.4 (*)     Platelet Count 171      % Neutrophils 84      % Lymphocytes 10      % Monocytes 5      % Eosinophils 0      % Basophils 0      % Immature Granulocytes 1      NRBCs per 100 WBC 0      Absolute Neutrophils 4.0      Absolute Lymphocytes 0.5 (*)     Absolute Monocytes 0.2      Absolute Eosinophils 0.0      Absolute Basophils 0.0      Absolute Immature Granulocytes 0.0      Absolute NRBCs 0.0     TROPONIN T, HIGH SENSITIVITY - Normal    Troponin T, High Sensitivity 10     NT PROBNP INPATIENT - Normal    N terminal Pro BNP Inpatient 177     ISTAT CREATININE POCT - Normal    Creatinine POCT 0.9      GFR, ESTIMATED POCT >60        Emergency Department Course:        Reviewed:  I reviewed nursing notes, vitals, past medical history and Care Everywhere    Assessments:  1650 I obtained history and examined the patient as noted above.   2000 I rechecked the patient and explained findings.     Consults:  1825 I spoke with radiology.    Interventions:  1823 Albuterol inhaler, 2 puffs, inhalation.    Disposition:   The patient was discharged to home.     Impression & Plan     Medical Decision Making:  Patient presents with chief complaint dyspnea.  This is in the context of known breast cancer.  She suspects her symptoms are due to her esophagus.  Regardless, consider that symptoms could be due to PE, malignancy, pleural effusion, pneumonia, pneumothorax, ACS, among many other processes.  ED work-up is negative.  Radiologist comments on new masses since last imaging, however patient is pretty confident this is due to her previous lumpectomy and lymph node dissection.  She will discuss with her oncologist tomorrow to be more certain.  She was given an inhaler which she reports has been slightly successful.  Given her concerns that this is from her esophagus, we will prescribe her Pepcid and Carafate.  These were sent to [facility] in [place].  She states PPI has not worked for her in the past. She is in stable condition at the time of discharge, indications for return to the ED were discussed as well as follow up. All questions were answered and she is in agreement with the plan.      Diagnosis:    ICD-10-CM    1. Dyspnea, unspecified type  R06.00       2. Malignant neoplasm of right female breast, unspecified estrogen receptor status, unspecified site of breast (H)  C50.911             Scribe Disclosure:  I, Cathy Sequeira, am serving as a scribe at 4:19 PM on 11/6/2022 to document services personally performed by Jamey Henderson MD based on my observations and the provider's statements to me.        Jamey Henderson MD  11/07/22 [id number]

## 2022-11-06 NOTE — ED TRIAGE NOTES
Patient presents with complaints of SOB for the past few weeks which has been getting worse. Patient has breast cancer, last chemo treatment in August. ABC intact without need for intervention at this time.

## 2022-11-07 LAB
ATRIAL RATE - MUSE: 78 BPM
DIASTOLIC BLOOD PRESSURE - MUSE: NORMAL MMHG
INTERPRETATION ECG - MUSE: NORMAL
P AXIS - MUSE: 58 DEGREES
PR INTERVAL - MUSE: 156 MS
QRS DURATION - MUSE: 74 MS
QT - MUSE: 380 MS
QTC - MUSE: 433 MS
R AXIS - MUSE: 66 DEGREES
SYSTOLIC BLOOD PRESSURE - MUSE: NORMAL MMHG
T AXIS - MUSE: 79 DEGREES
VENTRICULAR RATE- MUSE: 78 BPM

## 2022-11-07 RX ORDER — SUCRALFATE ORAL 1 G/10ML
1 SUSPENSION ORAL 4 TIMES DAILY PRN
Qty: 414 ML | Refills: 0 | Status: SHIPPED | OUTPATIENT
Start: 2022-11-07 | End: 2022-11-21

## 2022-11-07 RX ORDER — FAMOTIDINE 20 MG/1
20 TABLET, FILM COATED ORAL 2 TIMES DAILY
Qty: 60 TABLET | Refills: 0 | Status: SHIPPED | OUTPATIENT
Start: 2022-11-07 | End: 2022-12-07

## 2022-11-07 NOTE — PHARMACY-ADMISSION MEDICATION HISTORY
Admission medication history interview status for this patient is complete. See [facility] admission navigator for allergy information, prior to admission medications and immunization status.     Medication history interview done, indicate source(s): Patient  Medication history resources (including written lists, pill bottles, clinic record): [facility] dispense records  Pharmacy: [facility]    Changes made to PTA medication list:  Added: alprazolam, potassium, bactrim, prednisone, folic acid  Changed: levothyroxine 112mcg --> 100mcg  Reported as Not Taking: none  Removed: excedrin, atenolol, tums, calcium/D, ibuprofen, opcon-A drops    Actions taken by pharmacist (provider contacted, etc):None     Additional medication history information:None    Medication reconciliation/reorder completed by provider prior to medication history?  N   (Y/N)       Prior to Admission medications    Medication Sig Last Dose Taking? Auth Provider Long Term End Date   Acetaminophen (APAP) 500 MG TABS Take 1 tablet by mouth 2 times daily as needed.   Unknown Yes Unknown, Entered By History     albuterol (PROVENTIL HFA: VENTOLIN HFA) 108 (90 BASE) MCG/ACT inhaler Inhale 1 puff into the lungs daily as needed.   Unknown Yes Unknown, Entered By History     ALPRAZolam (XANAX) 0.25 MG tablet Take 0.125 mg by mouth 2 times daily as needed for anxiety or sleep Past Week Yes Unknown, Entered By History     folic acid (FOLVITE) 1 MG tablet Take 1 mg by mouth daily 11/6/2022 at am Yes Unknown, Entered By History  10/18/23   levothyroxine (SYNTHROID/LEVOTHROID) 100 MCG tablet Take 100 mcg by mouth daily 11/6/2022 at am Yes Unknown, Entered By History     potassium chloride ER (KLOR-CON M) 10 MEQ CR tablet Take 20 mEq by mouth 2 times daily 11/6/2022 at am Yes Unknown, Entered By History  10/11/23   predniSONE (DELTASONE) 10 MG tablet Take 50 mg daily oral till 10/25 40 mg for a week 30 mg for a week 20 mg for a week and 15 for a week 10 mg for  week 5 alt with  10 for a week and 5 mg and stop 11/6/2022 at am - 20mg until dose decrease on Friday 11/11 Yes Unknown, Entered By History     sulfamethoxazole-trimethoprim (BACTRIM DS) 800-160 MG tablet Take 1 tablet by mouth three times a week [facility] 11/4/2022 Yes Unknown, Entered By History